# Patient Record
Sex: MALE | Race: BLACK OR AFRICAN AMERICAN
[De-identification: names, ages, dates, MRNs, and addresses within clinical notes are randomized per-mention and may not be internally consistent; named-entity substitution may affect disease eponyms.]

---

## 2020-07-25 ENCOUNTER — HOSPITAL ENCOUNTER (INPATIENT)
Dept: HOSPITAL 75 - ER | Age: 30
LOS: 1 days | Discharge: LEFT BEFORE DISCHARGE | DRG: 310 | End: 2020-07-26
Attending: INTERNAL MEDICINE | Admitting: INTERNAL MEDICINE
Payer: SELF-PAY

## 2020-07-25 VITALS — WEIGHT: 196.21 LBS | BODY MASS INDEX: 28.09 KG/M2 | HEIGHT: 70.08 IN

## 2020-07-25 DIAGNOSIS — I48.0: Primary | ICD-10-CM

## 2020-07-25 DIAGNOSIS — F17.210: ICD-10-CM

## 2020-07-25 LAB
ALBUMIN SERPL-MCNC: 4.5 GM/DL (ref 3.2–4.5)
ALP SERPL-CCNC: 41 U/L (ref 40–136)
ALT SERPL-CCNC: 29 U/L (ref 0–55)
APTT BLD: 26 SEC (ref 24–35)
BASOPHILS # BLD AUTO: 0 10^3/UL (ref 0–0.1)
BASOPHILS NFR BLD AUTO: 0 % (ref 0–10)
BILIRUB SERPL-MCNC: 0.4 MG/DL (ref 0.1–1)
BUN/CREAT SERPL: 11
CALCIUM SERPL-MCNC: 9.9 MG/DL (ref 8.5–10.1)
CHLORIDE SERPL-SCNC: 107 MMOL/L (ref 98–107)
CO2 SERPL-SCNC: 25 MMOL/L (ref 21–32)
CREAT SERPL-MCNC: 1.16 MG/DL (ref 0.6–1.3)
EOSINOPHIL # BLD AUTO: 0 10^3/UL (ref 0–0.3)
EOSINOPHIL NFR BLD AUTO: 1 % (ref 0–10)
ERYTHROCYTE [DISTWIDTH] IN BLOOD BY AUTOMATED COUNT: 12.2 % (ref 10–14.5)
GFR SERPLBLD BASED ON 1.73 SQ M-ARVRAT: > 60 ML/MIN
GLUCOSE SERPL-MCNC: 77 MG/DL (ref 70–105)
HCT VFR BLD CALC: 42 % (ref 40–54)
HGB BLD-MCNC: 14.8 G/DL (ref 13.3–17.7)
INR PPP: 1 (ref 0.8–1.4)
LYMPHOCYTES # BLD AUTO: 2.8 X 10^3 (ref 1–4)
LYMPHOCYTES NFR BLD AUTO: 40 % (ref 12–44)
MAGNESIUM SERPL-MCNC: 2 MG/DL (ref 1.6–2.4)
MANUAL DIFFERENTIAL PERFORMED BLD QL: NO
MCH RBC QN AUTO: 32 PG (ref 25–34)
MCHC RBC AUTO-ENTMCNC: 35 G/DL (ref 32–36)
MCV RBC AUTO: 91 FL (ref 80–99)
MONOCYTES # BLD AUTO: 0.6 X 10^3 (ref 0–1)
MONOCYTES NFR BLD AUTO: 9 % (ref 0–12)
NEUTROPHILS # BLD AUTO: 3.6 X 10^3 (ref 1.8–7.8)
NEUTROPHILS NFR BLD AUTO: 50 % (ref 42–75)
PLATELET # BLD: 274 10^3/UL (ref 130–400)
PMV BLD AUTO: 9.5 FL (ref 7.4–10.4)
POTASSIUM SERPL-SCNC: 4.1 MMOL/L (ref 3.6–5)
PROT SERPL-MCNC: 7.7 GM/DL (ref 6.4–8.2)
PROTHROMBIN TIME: 13.3 SEC (ref 12.2–14.7)
SODIUM SERPL-SCNC: 143 MMOL/L (ref 135–145)
WBC # BLD AUTO: 7.1 10^3/UL (ref 4.3–11)

## 2020-07-25 PROCEDURE — 83874 ASSAY OF MYOGLOBIN: CPT

## 2020-07-25 PROCEDURE — 84100 ASSAY OF PHOSPHORUS: CPT

## 2020-07-25 PROCEDURE — 71045 X-RAY EXAM CHEST 1 VIEW: CPT

## 2020-07-25 PROCEDURE — 80061 LIPID PANEL: CPT

## 2020-07-25 PROCEDURE — 80053 COMPREHEN METABOLIC PANEL: CPT

## 2020-07-25 PROCEDURE — 93005 ELECTROCARDIOGRAM TRACING: CPT

## 2020-07-25 PROCEDURE — 80048 BASIC METABOLIC PNL TOTAL CA: CPT

## 2020-07-25 PROCEDURE — 36415 COLL VENOUS BLD VENIPUNCTURE: CPT

## 2020-07-25 PROCEDURE — 85730 THROMBOPLASTIN TIME PARTIAL: CPT

## 2020-07-25 PROCEDURE — 84443 ASSAY THYROID STIM HORMONE: CPT

## 2020-07-25 PROCEDURE — 85610 PROTHROMBIN TIME: CPT

## 2020-07-25 PROCEDURE — 83735 ASSAY OF MAGNESIUM: CPT

## 2020-07-25 PROCEDURE — 93041 RHYTHM ECG TRACING: CPT

## 2020-07-25 PROCEDURE — 87081 CULTURE SCREEN ONLY: CPT

## 2020-07-25 PROCEDURE — 85025 COMPLETE CBC W/AUTO DIFF WBC: CPT

## 2020-07-25 PROCEDURE — 84484 ASSAY OF TROPONIN QUANT: CPT

## 2020-07-25 RX ADMIN — Medication SCH MLS/HR: at 22:09

## 2020-07-25 NOTE — ED CARDIAC GENERAL
History of Present Illness


General


Chief Complaint:  Cardiac/General Problems


Stated Complaint:  AFIB


Nursing Triage Note:  


Patient reports feeling palpitations. reports having history of a-fib


Source:  patient, old records


Exam Limitations:  no limitations





History of Present Illness


Date Seen by Provider:  2020


Time Seen by Provider:  21:51


Initial Comments


This 29-year-old young man presents to the emergency room with palpitations and 

chest pain that started today around 19:00 after he did some mowing.  He reports

missing his flecainide and metoprolol dose yesterday.  He did take it today 

around 14:00.  He has history of paroxysmal A. fib and has seen Dr. Joel in the

past.  He is in A. fib with RVR on the monitor with a heart rate around 180.  He

takes aspirin 81 mg daily but is not anticoagulated.  He receives his primary 

care at the [facility] clinic and his prescriptions are written by Dr. Hurt.





Allergies and Home Medications


Allergies


Coded Allergies:  


     No Known Drug Allergies (Unverified , 12)





Home Medications


Aspirin 81 Mg Tablet.dr, 81 MG PO DAILY, (Reported)


Flecainide Acetate 50 Mg Tablet, 100 MG PO BID, (Reported)


[Metoprolol Succinate] 50 MG TAB, 50 MG PO DAILY


   Prescribed by: XAVIER MCKNIGHT NWNOLVIA on 6/10/15 0922





Patient Home Medication List


Home Medication List Reviewed:  Yes





Review of Systems


Review of Systems


Constitutional:  no symptoms reported


EENTM:  No Symptoms Reported


Respiratory:  No Symptoms Reported


Cardiovascular:  See HPI


Gastrointestinal:  No Symptoms Reported


Genitourinary:  No Symptoms Reported


Musculoskeletal:  no symptoms reported


Skin:  no symptoms reported


Psychiatric/Neurological:  No Symptoms Reported


Endocrine:  No Symptoms Reported


Hematologic/Lymphatic:  No Symptoms Reported





Past Medical-Social-Family Hx


Past Med/Social Hx:  Reviewed Nursing Past Med/Soc Hx


Patient Social History


Alcohol Use:  Regular Use


Alcohol Beverage of Choice:  Beer


Recreational Drug Use:  No


Smoking Status:  Current Everyday Smoker


Type Used:  Cigarettes


Recent Foreign Travel:  No


Contact w/Someone Who Travel:  No


Recent Infectious Disease Expo:  No





Immunizations Up To Date


PED Vaccines UTD:  No





Seasonal Allergies


Seasonal Allergies:  No





Past Medical History


Surgeries:  Yes


Abdominal, Orthopedic


Respiratory:  No


Cardiac:  Yes


Atrial Fibrillation (paroxysmal)


Reproductive Disorders:  No


Sexually Transmitted Disease:  No


HIV/AIDS:  No


Gastrointestinal:  No


Musculoskeletal:  No


Endocrine:  No


HEENT:  No


Cancer:  No


Psychosocial:  Yes


ADD/ADHD


Integumentary:  No





Family Medical History


Reviewed Nursing Family Hx





CVA


Diabetes mellitus


  19 MOTHER


Hypertension


  19 MOTHER





Physical Exam


Vital Signs





Vital Signs - First Documented








 20





 21:51


 


Temp 36.9


 


Pulse 176


 


Resp 18


 


B/P (MAP) 137/99 (112)


 


Pulse Ox 99


 


O2 Delivery Room Air





Capillary Refill : Less Than 3 Seconds


Height, Weight, BMI


Height: 5'10"


Weight: 116lbs. 12.8oz. 52.339161hm; 28.00 BMI


Method:Stated


General Appearance:  No Apparent Distress, WD/WN


HEENT:  PERRL/EOMI, Normal ENT Inspection


Neck:  Normal Inspection


Respiratory:  Lungs Clear, Normal Breath Sounds, No Accessory Muscle Use, No 

Respiratory Distress


Cardiovascular:  No Edema, No Murmur, Irregularly Irregular, Tachycardia


Gastrointestinal:  Normal Bowel Sounds, Non Tender, Soft


Extremity:  Normal Inspection, No Pedal Edema


Neurologic/Psychiatric:  Alert, Oriented x3, No Motor/Sensory Deficits, Normal 

Mood/Affect, CNs II-XII Norm as Tested


Skin:  Normal Color, Warm/Dry





Progress/Results/Core Measures


Results/Orders


Lab Results





Laboratory Tests








Test


 20


21:50 Range/Units


 


 


White Blood Count


 7.1 


 4.3-11.0


10^3/uL


 


Red Blood Count


 4.62 


 4.35-5.85


10^6/uL


 


Hemoglobin 14.8  13.3-17.7  G/DL


 


Hematocrit 42  40-54  %


 


Mean Corpuscular Volume 91  80-99  FL


 


Mean Corpuscular Hemoglobin 32  25-34  PG


 


Mean Corpuscular Hemoglobin


Concent 35 


 32-36  G/DL





 


Red Cell Distribution Width 12.2  10.0-14.5  %


 


Platelet Count


 274 


 130-400


10^3/uL


 


Mean Platelet Volume 9.5  7.4-10.4  FL


 


Neutrophils (%) (Auto) 50  42-75  %


 


Lymphocytes (%) (Auto) 40  12-44  %


 


Monocytes (%) (Auto) 9  0-12  %


 


Eosinophils (%) (Auto) 1  0-10  %


 


Basophils (%) (Auto) 0  0-10  %


 


Neutrophils # (Auto) 3.6  1.8-7.8  X 10^3


 


Lymphocytes # (Auto) 2.8  1.0-4.0  X 10^3


 


Monocytes # (Auto) 0.6  0.0-1.0  X 10^3


 


Eosinophils # (Auto)


 0.0 


 0.0-0.3


10^3/uL


 


Basophils # (Auto)


 0.0 


 0.0-0.1


10^3/uL


 


Prothrombin Time 13.3  12.2-14.7  SEC


 


INR Comment 1.0  0.8-1.4  


 


Activated Partial


Thromboplast Time 26 


 24-35  SEC





 


Sodium Level 143  135-145  MMOL/L


 


Potassium Level 4.1  3.6-5.0  MMOL/L


 


Chloride Level 107    MMOL/L


 


Carbon Dioxide Level 25  21-32  MMOL/L


 


Anion Gap 11  5-14  MMOL/L


 


Blood Urea Nitrogen 13  7-18  MG/DL


 


Creatinine


 1.16 


 0.60-1.30


MG/DL


 


Estimat Glomerular Filtration


Rate > 60 


  





 


BUN/Creatinine Ratio 11   


 


Glucose Level 77    MG/DL


 


Calcium Level 9.9  8.5-10.1  MG/DL


 


Corrected Calcium 9.5  8.5-10.1  MG/DL


 


Magnesium Level 2.0  1.6-2.4  MG/DL


 


Total Bilirubin 0.4  0.1-1.0  MG/DL


 


Aspartate Amino Transf


(AST/SGOT) 21 


 5-34  U/L





 


Alanine Aminotransferase


(ALT/SGPT) 29 


 0-55  U/L





 


Alkaline Phosphatase 41    U/L


 


Myoglobin


 26.0 


 10.0-92.0


NG/ML


 


Troponin I < 0.028  <0.028  NG/ML


 


Total Protein 7.7  6.4-8.2  GM/DL


 


Albumin 4.5  3.2-4.5  GM/DL


 


Thyroid Stimulating Hormone


(TSH) 1.12 


 0.35-4.94


UIU/ML








My Orders





Orders - FENG VANN MD


Diltiazem Injection (Cardizem Injection) (20 22:00)


Diltiazem Drip Pre-Mix (Cardizem Drip Pr (20 22:00)


Digoxin Injection (Lanoxin Injection) (20 23:45)


Apixaban Tablet (Eliquis Tablet) (20 23:45)





Medications Given in ED





Current Medications








 Medications  Dose


 Ordered  Sig/Kimberly


 Route  Start Time


 Stop Time Status Last Admin


Dose Admin


 


 Aspirin  324 mg  ONCE  ONCE


 PO  20 22:00


 20 22:01 DC 7/25/20 22:09


324 MG


 


 Diltiazem HCl  10 mg  ONCE  ONCE


 IVP  20 22:00


 20 22:01 DC 20 22:09


10 MG








Vital Signs/I&O











 20





 21:51 21:57


 


Temp 36.9 


 


Pulse 176 


 


Resp 18 


 


B/P (MAP) 137/99 (112) 


 


Pulse Ox 99 


 


O2 Delivery Room Air Room Air














Blood Pressure Mean:                    112











Progress


Progress Note #1:  


   Time:  22:18


Progress Note


Patient seen and examined.  Cardizem bolus and drip ordered.  Labs pending.


Progress Note #2:  


   Time:  23:30


Progress Note


Patient's Cardizem drip was titrated up to 20 mg per hour.  He was still running

a heart rate ranging from .  Case was reviewed with Dr. Felton.  He 

recommended giving a dose of his flecainide now and adding digoxin 0.5 mg IV and

Eliquis 5 mg orally.  Patient is being admitted to the ICU.


Initial ECG Impression Date:  2020


Initial ECG Impression Time:  21:53


Initial ECG Rate:  180


Initial ECG Rhythm:  A Fib/Flutter


Initial ECG Impression:  Atrial Fibrillation w/RVR


Comment


Atrial fibrillation with RVR.  Nonspecific T-wave changes.





Diagnostic Imaging





   Diagonstic Imaging:  Xray


   Plain Films/CT/US/NM/MRI:  chest


Comments


NAME:   NOE DEVINE


[facility] REC#:   [id number]


ACCOUNT#:   [id number]


PT STATUS:   REG ER


:   1990


PHYSICIAN:   YVONNE FELIZ APRN


ADMIT DATE:   20/ER


***Draft***


Date of Exam:20





CHEST 1 VIEW, AP/PA ONLY








INDICATION: Chest pain.





EXAMINATION: Single AP view of the chest was obtained.





COMPARISON: Study of 2015.





FINDINGS: Heart size and pulmonary vascularity are within normal


limits. There is no pneumothorax or consolidation. There is


similar appearance of eventrated left hemidiaphragm.





IMPRESSION: No acute abnormality or adverse change is detected. 





  Dictated on workstation # [id number]








Dict:   20


Trans:   20


[facility] [id number]





Interpreted by:     GARTH BOX MD


   Reviewed:  Reviewed by Me





Departure


Communication (Admissions)


Time/Spoke to Admitting Phy:  23:38


Dr. Rivero


Time/Spoke to Consulting Phy:  23:25


Dr. Felton





Impression





   Primary Impression:  


   Atrial fibrillation with RVR


Disposition:   ADMITTED AS INPATIENT


Condition:  Improved





Admissions


Decision to Admit Reason:  Admit from ER (General)


Decision to Admit/Date:  2020


Time/Decision to Admit Time:  23:20





Copy


Copies To 1:   CHERELLE HURT MD, JOSHUA T MD        2020 22:05

## 2020-07-25 NOTE — DIAGNOSTIC IMAGING REPORT
INDICATION: Chest pain.



EXAMINATION: Single AP view of the chest was obtained.



COMPARISON: Study of 06/08/2015.



FINDINGS: Heart size and pulmonary vascularity are within normal

limits. There is no pneumothorax or consolidation. There is

similar appearance of eventrated left hemidiaphragm.



IMPRESSION: No acute abnormality or adverse change is detected. 



Dictated by: 



  Dictated on workstation # [id number]

## 2020-07-26 VITALS — SYSTOLIC BLOOD PRESSURE: 123 MMHG | DIASTOLIC BLOOD PRESSURE: 89 MMHG

## 2020-07-26 VITALS — DIASTOLIC BLOOD PRESSURE: 74 MMHG | SYSTOLIC BLOOD PRESSURE: 109 MMHG

## 2020-07-26 VITALS — SYSTOLIC BLOOD PRESSURE: 113 MMHG | DIASTOLIC BLOOD PRESSURE: 85 MMHG

## 2020-07-26 VITALS — SYSTOLIC BLOOD PRESSURE: 115 MMHG | DIASTOLIC BLOOD PRESSURE: 74 MMHG

## 2020-07-26 VITALS — SYSTOLIC BLOOD PRESSURE: 128 MMHG | DIASTOLIC BLOOD PRESSURE: 80 MMHG

## 2020-07-26 VITALS — SYSTOLIC BLOOD PRESSURE: 103 MMHG | DIASTOLIC BLOOD PRESSURE: 71 MMHG

## 2020-07-26 VITALS — DIASTOLIC BLOOD PRESSURE: 81 MMHG | SYSTOLIC BLOOD PRESSURE: 116 MMHG

## 2020-07-26 VITALS — SYSTOLIC BLOOD PRESSURE: 113 MMHG | DIASTOLIC BLOOD PRESSURE: 77 MMHG

## 2020-07-26 VITALS — DIASTOLIC BLOOD PRESSURE: 93 MMHG | SYSTOLIC BLOOD PRESSURE: 134 MMHG

## 2020-07-26 VITALS — SYSTOLIC BLOOD PRESSURE: 126 MMHG | DIASTOLIC BLOOD PRESSURE: 75 MMHG

## 2020-07-26 VITALS — SYSTOLIC BLOOD PRESSURE: 145 MMHG | DIASTOLIC BLOOD PRESSURE: 97 MMHG

## 2020-07-26 VITALS — DIASTOLIC BLOOD PRESSURE: 64 MMHG | SYSTOLIC BLOOD PRESSURE: 99 MMHG

## 2020-07-26 VITALS — SYSTOLIC BLOOD PRESSURE: 130 MMHG | DIASTOLIC BLOOD PRESSURE: 84 MMHG

## 2020-07-26 VITALS — SYSTOLIC BLOOD PRESSURE: 94 MMHG | DIASTOLIC BLOOD PRESSURE: 65 MMHG

## 2020-07-26 VITALS — SYSTOLIC BLOOD PRESSURE: 138 MMHG | DIASTOLIC BLOOD PRESSURE: 93 MMHG

## 2020-07-26 VITALS — SYSTOLIC BLOOD PRESSURE: 120 MMHG | DIASTOLIC BLOOD PRESSURE: 81 MMHG

## 2020-07-26 LAB
BASOPHILS # BLD AUTO: 0 10^3/UL (ref 0–0.1)
BASOPHILS NFR BLD AUTO: 0 % (ref 0–10)
BUN/CREAT SERPL: 11
CALCIUM SERPL-MCNC: 9.8 MG/DL (ref 8.5–10.1)
CHLORIDE SERPL-SCNC: 105 MMOL/L (ref 98–107)
CHOLEST SERPL-MCNC: 188 MG/DL (ref ?–200)
CO2 SERPL-SCNC: 26 MMOL/L (ref 21–32)
CREAT SERPL-MCNC: 1.07 MG/DL (ref 0.6–1.3)
EOSINOPHIL # BLD AUTO: 0 10^3/UL (ref 0–0.3)
EOSINOPHIL NFR BLD AUTO: 0 % (ref 0–10)
ERYTHROCYTE [DISTWIDTH] IN BLOOD BY AUTOMATED COUNT: 12.3 % (ref 10–14.5)
GFR SERPLBLD BASED ON 1.73 SQ M-ARVRAT: > 60 ML/MIN
GLUCOSE SERPL-MCNC: 93 MG/DL (ref 70–105)
HCT VFR BLD CALC: 41 % (ref 40–54)
HDLC SERPL-MCNC: 56 MG/DL (ref 40–60)
HGB BLD-MCNC: 14.3 G/DL (ref 13.3–17.7)
LYMPHOCYTES # BLD AUTO: 2.8 X 10^3 (ref 1–4)
LYMPHOCYTES NFR BLD AUTO: 42 % (ref 12–44)
MAGNESIUM SERPL-MCNC: 2.1 MG/DL (ref 1.6–2.4)
MANUAL DIFFERENTIAL PERFORMED BLD QL: NO
MCH RBC QN AUTO: 32 PG (ref 25–34)
MCHC RBC AUTO-ENTMCNC: 35 G/DL (ref 32–36)
MCV RBC AUTO: 91 FL (ref 80–99)
MONOCYTES # BLD AUTO: 0.6 X 10^3 (ref 0–1)
MONOCYTES NFR BLD AUTO: 9 % (ref 0–12)
NEUTROPHILS # BLD AUTO: 3.2 X 10^3 (ref 1.8–7.8)
NEUTROPHILS NFR BLD AUTO: 48 % (ref 42–75)
PHOSPHATE SERPL-MCNC: 5.1 MG/DL (ref 2.3–4.7)
PLATELET # BLD: 278 10^3/UL (ref 130–400)
PMV BLD AUTO: 9.8 FL (ref 7.4–10.4)
POTASSIUM SERPL-SCNC: 3.9 MMOL/L (ref 3.6–5)
SODIUM SERPL-SCNC: 142 MMOL/L (ref 135–145)
TRIGL SERPL-MCNC: 146 MG/DL (ref ?–150)
VLDLC SERPL CALC-MCNC: 29 MG/DL (ref 5–40)
WBC # BLD AUTO: 6.7 10^3/UL (ref 4.3–11)

## 2020-07-26 RX ADMIN — Medication SCH MLS/HR: at 06:52

## 2020-07-26 NOTE — XMS REPORT
[facility]

                             Created on: 2017



Sarthak Wilhelm

External Reference #: [id number]

: 1990

Sex: Male



Demographics





                          Address                   [address] [place]  [address]

 

                          Preferred Language        Unknown

 

                          Marital Status            Unknown

 

                          Yazidi Affiliation     Unknown

 

                          Race                      Unknown

 

                          Ethnic Group              Unknown





Author





                          Author                    Sarthak CALLOWAY

 

                          Organization              [facility]

 

                          Address                   [address] [place]  [address]



 

                          Phone                     [phone]







Care Team Providers





                    Care Team Member Name Role                Phone

 

                    GAIL CALLOWAYICE    Unavailable         [phone]







PROBLEMS





          Type      Condition ICD9-CM Code TEL99-DY Code Onset Dates Condition S

tatus SNOMED 

Code

 

          Problem   Atrial fibrillation with RVR           I48.91              A

ctive    [id number]

 

          Problem   Atrial fibrillation, unspecified type           I48.91      

        Active    22037638

 

          Problem   Adjustment disorder with depressed mood 309.0               

          Active    15891072

 

          Problem   Major depressive disorder, single episode, unspecified      

     F32.9               Active    

[id number]

 

             Problem      Paroxysmal atrial fibrillation with rapid ventricular 

response              I48.0         

                          Active                    [id number]







ALLERGIES

No Known Allergies



SOCIAL HISTORY

Never Assessed



PLAN OF CARE





                          Activity                  Details

 

                                         

 

                          Follow Up                 prn Reason:

 

                          Future/Pending Procedure  EKG, TRACING (IN-HOUSE) 



                                        



VITAL SIGNS





                    Height              70 in               2017

 

                    Weight              174 lbs             2017

 

                    Temperature         97.8 degrees Fahrenheit 2017

 

                    Heart Rate          85 bpm              2017

 

                    Respiratory Rate    16                  2017

 

                    BMI                 24.96 kg/m2         2017

 

                    Blood pressure systolic 134 mmHg            2017

 

                    Blood pressure diastolic 80 mmHg             2017







MEDICATIONS





        Medication Instructions Dosage  Frequency Start Date End Date Duration S

raya

 

        Flecainide Acetate 100 MG Orally every 12 hrs 1 tablet 12h              

               Active

 

        Aspir-81 81 MG Orally Once a day 1 tablet 24h                           

  Active

 

        Toprol XL 50 mg Orally Once a day 1 tablet 24h                     30 da

ys Active







RESULTS

No Results



PROCEDURES





                Procedure       Date Ordered    Result          Body Site

 

                ELECTROCARDIOGRAM, TRACING May 18, 2017                     







IMMUNIZATIONS

No Known Immunizations



MEDICAL (GENERAL) HISTORY





                    Type                Description         Date

 

                    Medical History     Atrial Fibrillation  

 

                    Surgical History    Right hand fx        

 

                    Hospitalization History Atrial fibrillation

## 2020-07-26 NOTE — XMS REPORT
[facility]

                             Created on: 2016



Sarthak Wilhelm

External Reference #: [id number]

: 1990

Sex: Male



Demographics





                          Address                   [address] [place]  [address]

 

                          Home Phone                [phone]

 

                          Preferred Language        Unknown

 

                          Marital Status            Unknown

 

                          Islam Affiliation     Unknown

 

                          Race                      Black

 

                          Ethnic Group              Not  or 





Author





                          Author                    Sarthka DUONG

 

                          Organization              [employer]

 

                          Address                   Unknown

 

                          Phone                     Unavailable







Care Team Providers





                    Care Team Member Name Role                Phone

 

                    SHERRY DUONG       CP                  Unavailable



                                                                



Allergies, Adverse Reactions, Alerts

          



                    Substance           Reaction            Event Type

 

                    N.K.D.A.            Info Not Available  Non Drug Allergy



                                                                                
       



Problems

          



             Problem Type Condition    Code         Onset Dates  Condition Statu

s

 

             Problem      Adjustment disorder with depressed mood 309.0         

            Active

 

                Assessment      Paroxysmal atrial fibrillation with rapid ventri

cular response I48.0           

                                        Active

 

             Problem      Paroxysmal atrial fibrillation with rapid ventricular 

response I48.0                     

Active

 

             Assessment   Long-term use of high-risk medication Z79.899         

          Active



                                                                                
                                     



Medications

          



        Medication Code System Code    Instructions Start Date End Date Status  

Dosage

 

        Flecainide Acetate NDC     [address]-08 100 MG Orally every 12 hrs     

                    1 tablet

 

        Toprol XL NDC     [address]-23 50 MG Orally Once a day                 

        0.5  tablet

 

        Aspir-81 NDC     [address]-75 81 MG Orally Once a day                  

       1 tablet



                                                                                
                           



Procedures

          



                Procedure       Coding System   Code            Date

 

                COMPREHEN METABOLIC PANEL CPT-4           83465           

 

                ASSAY OF MAGNESIUM CPT-4           04331           2016

 

                COMPLETE CBC W/AUTO DIFF WBC CPT-4           85841           

 

                Office Visit, Est Pt., Level 5 CPT-4           11861           J

azul 2016

 

                LIPID PANEL     CPT-4           33235           2016

 

                ASSAY THYROID STIM HORMONE CPT-4           96039           2016

 

                VENIPUNCT, ROUTINE* CPT-4           88125           , 201

6

 

                ELECTROCARDIOGRAM, TRACING CPT-4           30631           2016



                                                                                
                                                                                
      



Vital Signs

          



                          Date/Time:                2016

 

                          Cardiac Monitoring Heart Rate 96 bpm

 

                          Weight                    158.8 lbs

 

                          Height                    70 in

 

                          Blood Pressure Diastolic  76 mmHg

 

                          Blood Pressure Systolic   120 mmHg



                                                                              



Results

          No Known Results                                                      
             



Summary Purpose

          [employer] Submission

## 2020-07-26 NOTE — XMS REPORT
[facility]

                             Created on: 2017



Sarthak Wilhelm

External Reference #: [id number]

: 1990

Sex: Male



Demographics





                          Address                   [address] [place]  [address]

 

                          Preferred Language        Unknown

 

                          Marital Status            Unknown

 

                          Yazidism Affiliation     Unknown

 

                          Race                      Unknown

 

                          Ethnic Group              Unknown





Author





                          Author                    Sarthak MURO

 

                          Organization              [facility]

 

                          Address                   [address] [place]  [address]



 

                          Phone                     [phone]







Care Team Providers





                    Care Team Member Name Role                Phone

 

                    KAYLA MURO      Unavailable         [phone]







PROBLEMS





          Type      Condition ICD9-CM Code AXL54-JW Code Onset Dates Condition S

tatus SNOMED 

Code

 

          Problem   Atrial fibrillation with RVR           I48.91              A

ctive    [id number]

 

          Problem   Atrial fibrillation, unspecified type           I48.91      

        Active    13664757

 

          Problem   Adjustment disorder with depressed mood 309.0               

          Active    16707749

 

          Problem   Major depressive disorder, single episode, unspecified      

     F32.9               Active    

[id number]

 

             Problem      Paroxysmal atrial fibrillation with rapid ventricular 

response              I48.0         

                          Active                    [id number]







ALLERGIES





             Substance    Reaction     Event Type   Date         Status

 

             N.K.D.A.     Unknown      Non Drug Allergy  Unknown







SOCIAL HISTORY

No smoking Hx information available



PLAN OF CARE





VITAL SIGNS





                    Height              70 in               2017

 

                    Temperature         97.9 degrees Fahrenheit 2017

 

                    Heart Rate          80 bpm              2017

 

                    Respiratory Rate    16                  2017

 

                    Blood pressure systolic 130 mmHg            2017

 

                    Blood pressure diastolic 82 mmHg             2017







MEDICATIONS





        Medication Instructions Dosage  Frequency Start Date End Date Duration S

tatus

 

        Aspir-81 81 MG Orally Once a day 1 tablet 24h                           

  Active

 

        Flecainide Acetate 100 MG Orally every 12 hrs 1 tablet 12h              

               Active

 

        Toprol XL 50 mg Orally Once a day 1 tablet 24h                          

   Active







RESULTS

No Results



PROCEDURES





                Procedure       Date Ordered    Related Diagnosis Body Site

 

                Office Visit, Est Pt., Level 3 2017                     







IMMUNIZATIONS

No Known Immunizations

## 2020-07-26 NOTE — XMS REPORT
[facility]

                             Created on: 2017



Sarthak Wilhelm

External Reference #: [id number]

: 1990

Sex: Male



Demographics





                          Address                   [address] [place]  [address]

 

                          Preferred Language        Unknown

 

                          Marital Status            Unknown

 

                          Sikhism Affiliation     Unknown

 

                          Race                      Unknown

 

                          Ethnic Group              Unknown





Author





                          Author                    Sarthak DUONG

 

                          [facility]

 

                          Address                   [address] [place]  [address]



 

                          Phone                     [phone]







Care Team Providers





                    Care Team Member Name Role                Phone

 

                    SHERRY DUNOG       Unavailable         [phone]







PROBLEMS





          Type      Condition ICD9-CM Code YBX49-DP Code Onset Dates Condition S

tatus SNOMED 

Code

 

          Problem   Atrial fibrillation, unspecified type           I48.91      

        Active    75141524

 

          Problem   Major depressive disorder, single episode, unspecified      

     F32.9               Active    

51545524

 

             Problem      Paroxysmal atrial fibrillation with rapid ventricular 

response              I48.0         

                          Active                    128985746

 

          Problem   Adjustment disorder with depressed mood 309.0               

          Active    95011453







ALLERGIES

Unknown Allergies



SOCIAL HISTORY

No smoking Hx information available



PLAN OF CARE





VITAL SIGNS





MEDICATIONS





        Medication Instructions Dosage  Frequency Start Date End Date Duration S

tatus

 

                    Flecainide Acetate 100 MG Orally every 12 hrs-MUST KEEP APPO

INTMENT FOR REFILL 1

tablet                                              15 days      Active







RESULTS

No Results



PROCEDURES

No Known procedures



IMMUNIZATIONS

No Known Immunizations

## 2020-07-26 NOTE — XMS REPORT
[facility]

                             Created on: 2018



Sarthak Wilhelm

External Reference #: [id number]

: 1990

Sex: Male



Demographics





                          Address                   [address] [place]  [address]

 

                          Preferred Language        Unknown

 

                          Marital Status            Unknown

 

                          Quaker Affiliation     Unknown

 

                          Race                      Unknown

 

                          Ethnic Group              Unknown





Author





                          Author                    Sarthak SHOEMAKER

 

                          Organization              [facility]

 

                          Address                   [address] [place]  [address]



 

                          Phone                     [phone]







Care Team Providers





                    Care Team Member Name Role                Phone

 

                    PADMINI SHOEMAKER      Unavailable         [phone]







PROBLEMS





          Type      Condition ICD9-CM Code YEQ24-RS Code Onset Dates Condition S

tatus SNOMED 

Code

 

          Problem   Psychophysiological insomnia           F51.04              A

ctive    [id number]

 

          Problem   Atrial fibrillation with RVR           I48.91              A

ctive    [id number]

 

          Problem   Major depressive disorder, single episode, unspecified      

     F32.9               Active    

[id number]

 

             Problem      Paroxysmal atrial fibrillation with rapid ventricular 

response              I48.0         

                          Active                    [id number]







ALLERGIES

No Known Allergies



ENCOUNTERS





                Encounter       Location        Date            Diagnosis

 

                          [place] [id number]

[address] [place] [address]

                          29 Aug, 2018              Atrial fibrillation with RVR

 I48.91

 

                          [facility]     [address] [facility] [id number]

[address] [place] [address]

                                         

 

                          [place] [address] [place] [id number]

[address] [place] [address]

                                        Establishing care with new d

octor, encounter for Z76.89 ; Atrial 

fibrillation with RVR I48.91 ; Psychophysiological insomnia F51.04 and Viral 
syndrome B34.9

 

                          [facility]     [address] [facility] [id number]

[address] [place] [phone]

                          19 Dec, 2017              Paroxysmal atrial fibrillati

on with rapid ventricular response 

I48.0 and Viral syndrome B34.9

 

                          [place] [address] [facility] [id number]

[address] [place] [phone]

                                         

 

                          [facility]  [address] [facility] [id number]

[address] [place] [address] 

[phone]                18 May, 2017              Muscle strain of chest wall,

 initial encounter S29.011A 

and Chest pain, unspecified type R07.9

 

                          [place] [address] [facility] [id number]

[address] [place] [address]

                          02 May, 2017              Paroxysmal atrial fibrillati

on with rapid ventricular response 

I48.0

 

                          [place] [address] [facility] [id number]

[address] [place] [address]

                                        Paroxysmal atrial fibrillati

on with rapid ventricular response 

I48.0

 

                          [facility] [place] [address] [id number]

[address] [place] [address] 

[phone]                              Atrial fibrillation with RVR

 I48.91

 

                          [place] [address] [facility] [id number]

[address] [place] [address]

                          27 Dec, 2016              Paroxysmal atrial fibrillati

on with rapid ventricular response 

I48.0 and Long-term use of high-risk medication Z79.899

 

                          [place] [address] [place] [id number]

[address] [place] [address]

                          12 Dec, 2016               

 

                          [facility] [place] [address] [facility] [id number]

[address] [place] [address] 

[phone]                14 2016              Atrial fibrillation, unspeci

fied type I48.91

 

                          [facility] [place] [address] [facility] [id number]

[address] [place] [address] 

[phone]                05 2016              Atrial fibrillation, unspeci

fied type I48.91

 

                          [facility] [place] [address] [place] [id number]

[address] [place] [address] 

[phone]                17 Oct, 2016              Major depressive disorder, s

ger episode, unspecified 

F32.9 ; Other fatigue R53.83 and Chest pain on breathing R07.1

 

                          [place] [address] [facility] [id number]

[address] [place] [phone]

                          23 Sep, 2016              PAF (paroxysmal atrial fibri

llation) I48.0 ; Dyspnea, unspecified 

type R06.00 ; Chest pain, unspecified type R07.9 and Light headedness R42

 

                          [place] [address] [facility] [id number]

[address] [place] [address]

                                        Paroxysmal atrial fibrillati

on with rapid ventricular response 

I48.0 and Long-term use of high-risk medication Z79.899

 

                          [facility]     [address] [facility] [id number]

[address] [place] [phone]

                                        Adjustment disorder with dep

ressed mood 309.0 and No condition on 

Axis II V71.09

 

                          [facility]     [address] [facility] [id number]

[address] [place] [address]

                                        Atrial fibrillation 427.31

 

                          [place] [address] [facility] [id number]

[address] [place] [phone]

                          15 [address], 2015               







IMMUNIZATIONS

No Known Immunizations



SOCIAL HISTORY

Never Assessed



REASON FOR VISIT

A Fib , needing refill on medications -- clement looney



PLAN OF CARE





                          Activity                  Details

 

                                         

 

                          Follow Up                 w/ Dr. Joel Reason:a-fib







VITAL SIGNS





                    Height              70 in               2018

 

                    Weight              194.0 lbs           2018

 

                    Temperature         98.7 degrees Fahrenheit 2018

 

                    Heart Rate          86 bpm              2018

 

                    Respiratory Rate    20                  2018

 

                    BMI                 27.83 kg/m2         2018

 

                    Blood pressure systolic 142 mmHg            2018

 

                    Blood pressure diastolic 90 mmHg             2018







MEDICATIONS





        Medication Instructions Dosage  Frequency Start Date End Date Duration S

tatus

 

        Toprol XL 50 mg Orally Once a day 1 tablet 24h                     30 da

ys Active

 

        Aspir-81 81 MG Orally Once a day 1 tablet 24h                     30 day

s Active

 

        Flecainide Acetate 100 mg Orally every 12 hrs 1 tablet 12h              

       30 days Active







RESULTS

No Results



PROCEDURES

No Known procedures



INSTRUCTIONS





MEDICATIONS ADMINISTERED

No Known Medications



MEDICAL (GENERAL) HISTORY





                    Type                Description         Date

 

                    Medical History     Atrial Fibrillation  

 

                    Surgical History    Right hand fx        

 

                    Hospitalization History Atrial fibrillation

## 2020-07-26 NOTE — NUR
Pt verbalized desire to leave. Told pt Dr. Felton would be here shortly to see him. Pt does 
not want to wait. Discussed risks of leaving AMA, including deteriorating condition and/or 
death. Pt verbalizes understanding. Dr. Rivero notified.

## 2020-07-26 NOTE — XMS REPORT
[facility]

                             Created on: 2018



Sarthak Wilhelm

External Reference #: [id number]

: 1990

Sex: Male



Demographics





                          Address                   [address] [place]  [address]

 

                          Preferred Language        Unknown

 

                          Marital Status            Unknown

 

                          Anabaptism Affiliation     Unknown

 

                          Race                      Unknown

 

                          Ethnic Group              Unknown





Author





                          Author                    Sarthak SHOEMAKER

 

                          Organization              [facility]

 

                          Address                   [address] [place]  [address]



 

                          Phone                     [phone]







Care Team Providers





                    Care Team Member Name Role                Phone

 

                    PADMINI SHOEMAKER      Unavailable         [phone]







PROBLEMS





          Type      Condition ICD9-CM Code DSA39-PV Code Onset Dates Condition S

tatus SNOMED 

Code

 

          Problem   Psychophysiological insomnia           F51.04              A

ctive    [id number]

 

          Problem   Atrial fibrillation with RVR           I48.91              A

ctive    [id number]

 

          Problem   Major depressive disorder, single episode, unspecified      

     F32.9               Active    

[id number]

 

             Problem      Paroxysmal atrial fibrillation with rapid ventricular 

response              I48.0         

                          Active                    [id number]







ALLERGIES

No Information



ENCOUNTERS





                Encounter       Location        Date            Diagnosis

 

                          [facility]     [address] [place] [id number]

[address] [place] [phone]

                          29 Aug, 2018              Atrial fibrillation with RVR

 I48.91

 

                          [facility]     [address] [facility] [id number]

[address] [place] [phone]

                                         

 

                          [place] [address] [place] [id number]

[address] [place] [address]

                                        Establishing care with new d

octor, encounter for Z76.89 ; Atrial 

fibrillation with RVR I48.91 ; Psychophysiological insomnia F51.04 and Viral 
syndrome B34.9

 

                          [facility]     [address] [facility] [id number]

[address] [place] [phone]

                          19 Dec, 2017              Paroxysmal atrial fibrillati

on with rapid ventricular response 

I48.0 and Viral syndrome B34.9

 

                          [place] [id number] [place] [id number]

[address] [place] [phone]

                                         

 

                          [facility]  [address] [facility] [id number]

[address] [place] [address] 

[phone]                18 May, 2017              Muscle strain of chest wall,

 initial encounter S29.011A 

and Chest pain, unspecified type R07.9

 

                          [place] [address] [facility] [id number]

[address] [place] [phone]

                          02 May, 2017              Paroxysmal atrial fibrillati

on with rapid ventricular response 

I48.0

 

                          [place] [address] [facility] [id number]

[address] [place] [phone]

                                        Paroxysmal atrial fibrillati

on with rapid ventricular response 

I48.0

 

                          [facility] WALK IN [place] [address] [id number]

[address] [place] [address] 

[address]                              Atrial fibrillation with RVR

 I48.91

 

                          [place] [address] [id number]

[address] [place] [phone]

                          27 Dec, 2016              Paroxysmal atrial fibrillati

on with rapid ventricular response 

I48.0 and Long-term use of high-risk medication Z79.899

 

                          [place] [address] [place] [id number]

[address] [place] [phone]

                          12 Dec, 2016               

 

                          [facility] [place] [address] [id number]

[address] [place] [address] 

[phone]                14 2016              Atrial fibrillation, unspeci

fied type I48.91

 

                          [facility] [place] [address] [facility] [id number]

[address] [place] [address] 

[phone]                05 2016              Atrial fibrillation, unspeci

fied type I48.91

 

                          [facility] [place] [address] [place] [id number]

[address] [place] [address] 

[phone]                17 Oct, 2016              Major depressive disorder, s

ger episode, unspecified 

F32.9 ; Other fatigue R53.83 and Chest pain on breathing R07.1

 

                          [place] [address] [facility] [id number]

[address] [place] [phone]

                          23 Sep, 2016              PAF (paroxysmal atrial fibri

llation) I48.0 ; Dyspnea, unspecified 

type R06.00 ; Chest pain, unspecified type R07.9 and Light headedness R42

 

                          [place] [address] [facility] [id number]

[address] [place] [phone]

                                        Paroxysmal atrial fibrillati

on with rapid ventricular response 

I48.0 and Long-term use of high-risk medication Z79.899

 

                          [place] [address] [facility] [id number]

[address] [place] [phone]

                                        Adjustment disorder with dep

ressed mood 309.0 and No condition on 

Axis II V71.09

 

                          [place] [address] [facility] [id number]

[address] [place] [phone]

                                        Atrial fibrillation 427.31

 

                          [place] [address] [facility] [id number]

[address] [place] [phone]

                          15 [address], 2015               







IMMUNIZATIONS

No Known Immunizations



SOCIAL HISTORY

Never Assessed



REASON FOR VISIT

Medication refill request



PLAN OF CARE





VITAL SIGNS





MEDICATIONS

No Known Medications



RESULTS

No Results



PROCEDURES

No Known procedures



INSTRUCTIONS





MEDICATIONS ADMINISTERED

No Known Medications



MEDICAL (GENERAL) HISTORY





                    Type                Description         Date

 

                    Medical History     Atrial Fibrillation  

 

                    Surgical History    Right hand fx        

 

                    Hospitalization History Atrial fibrillation
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
## 2020-07-26 NOTE — XMS REPORT
[facility]

                             Created on: 2018



Sarthak Wilhelm

External Reference #: [id number]

: 1990

Sex: Male



Demographics





                          Address                   [address] [place]  [address]

 

                          Preferred Language        Unknown

 

                          Marital Status            Unknown

 

                          Church Affiliation     Unknown

 

                          Race                      Unknown

 

                          Ethnic Group              Unknown





Author





                          Author                    Sarthak DUONG

 

                          Organization              [facility]

 

                          Address                   [address] [place]  [address]



 

                          Phone                     [phone]







Care Team Providers





                    Care Team Member Name Role                Phone

 

                    CLAUDIALAURA  SHERRY       Unavailable         [phone]







PROBLEMS





          Type      Condition ICD9-CM Code QNB67-EZ Code Onset Dates Condition S

tatus SNOMED 

Code

 

          Problem   Psychophysiological insomnia           F51.04              A

ctive    [id number]

 

          Problem   Atrial fibrillation with RVR           I48.91              A

ctive    [id number]

 

          Problem   Major depressive disorder, single episode, unspecified      

     F32.9               Active    

[id number]

 

             Problem      Paroxysmal atrial fibrillation with rapid ventricular 

response              I48.0         

                          Active                    [id number]







ALLERGIES

No Information



ENCOUNTERS





                Encounter       Location        Date            Diagnosis

 

                          [facility]     [address] [facility] [id number]

[address] [place] [phone]

                                        Establishing care with new d

octor, encounter for Z76.89 ; Atrial 

fibrillation with RVR I48.91 ; Psychophysiological insomnia F51.04 and Viral 
syndrome B34.9

 

                          [facility]     [address] [id number]

[address] [place] [phone]

                          19 Dec, 2017              Paroxysmal atrial fibrillati

on with rapid ventricular response 

I48.0 and Viral syndrome B34.9

 

                          [facility]     [address] [facility] [id number]

[address] [place] [phone]

                                         

 

                          [facility]  [address] [facility] [id number]

[address] [place] [address] 

[phone]                18 May, 2017              Muscle strain of chest wall,

 initial encounter S29.011A 

and Chest pain, unspecified type R07.9

 

                          [facility]     [address] [facility] [id number]

[address] [place] [phone]

                          02 May, 2017              Paroxysmal atrial fibrillati

on with rapid ventricular response 

I48.0

 

                          [facility]     [address] [facility] [id number]

[address] [place] [phone]

                                        Paroxysmal atrial fibrillati

on with rapid ventricular response 

I48.0

 

                          [facility]  [address] [facility] [id number]

[address] [place] [address] 

[phone]                              Atrial fibrillation with RVR

 I48.91

 

                          [place] [address] [facility] [id number]

[address] [place] [phone]

                          27 Dec, 2016              Paroxysmal atrial fibrillati

on with rapid ventricular response 

I48.0 and Long-term use of high-risk medication Z79.899

 

                          [place] [address] [facility] [id number]

[address] [place] [phone]

                          12 Dec, 2016               

 

                          [facility] [place] [address] [facility] [id number] [place] [address] 

[phone]                14 2016              Atrial fibrillation, unspeci

fied type I48.91

 

                          [facility] [place] [address] [facility] [id number]

[address] [place] [address] 

[phone]                05 2016              Atrial fibrillation, unspeci

fied type I48.91

 

                          [facility] [place] [address] [facility] [id number]

[address] [place] [address] 

[phone]                17 Oct, 2016              Major depressive disorder, s

ger episode, unspecified 

F32.9 ; Other fatigue R53.83 and Chest pain on breathing R07.1

 

                          [place] [address] [facility] [id number]

[address] [place] [phone]

                          23 Sep, 2016              PAF (paroxysmal atrial fibri

llation) I48.0 ; Dyspnea, unspecified 

type R06.00 ; Chest pain, unspecified type R07.9 and Light headedness R42

 

                          [place] [address] [facility] [id number]

[address] [place] [phone]

                                        Paroxysmal atrial fibrillati

on with rapid ventricular response 

I48.0 and Long-term use of high-risk medication Z79.899

 

                          [place] [address] [facility] [id number]

[address] [place] [phone]

                                        Adjustment disorder with dep

ressed mood 309.0 and No condition on 

Axis II V71.09

 

                          [facility]     [address] [facility] [id number]

[address] [place] [phone]

                                        Atrial fibrillation 427.31

 

                          [facility]     [address] [facility] [id number]

[address] [place] [phone]

                          15 Carl, 2015               







IMMUNIZATIONS

No Known Immunizations



SOCIAL HISTORY

Never Assessed



REASON FOR VISIT

 refills



PLAN OF CARE





VITAL SIGNS





MEDICATIONS





        Medication Instructions Dosage  Frequency Start Date End Date Duration S

tatus

 

        Toprol XL 50 mg         TAKE ONE TABLET BY MOUTH ONCE DAILY             

            30 days Active

 

          Flecainide Acetate 100 mg           TAKE ONE TABLET BY MOUTH EVERY 12 

HOURS                               30 

days                                    Active







RESULTS

No Results



PROCEDURES

No Known procedures



INSTRUCTIONS





MEDICATIONS ADMINISTERED

No Known Medications



MEDICAL (GENERAL) HISTORY





                    Type                Description         Date

 

                    Medical History     Atrial Fibrillation  

 

                    Surgical History    Right hand fx        

 

                    Hospitalization History Atrial fibrillation

## 2020-07-26 NOTE — HISTORY & PHYSICAL-HOSPITALIST
History of Present Illness


HPI/Chief Complaint


This is a 29-year-old black male with a history of paroxysmal atrial  

fibrillation since the time he was in his early 20s.  The patient had been doing

fairly well on his flecainide and beta blocker.  He presented to the emergency 

room last night with complaints of sustained atrial fibrillation for over 2 

hours with some chest tightness.  The night before he had had too much to drink 

with beer and whiskey and then had mowed the grass with profuse sweating the 

morning of  this presentation.  He was found to be in A. fib with RVR .  

Overnight he's been on a diltiazem drip and his flecainide is been continued and

he has converted to sinus rhythm.  This morning he is feeling much better and 

has no complaints.  Dr. Seo is put him on Eliquis as well.


Source:  patient


Exam Limitations:  no limitations


Date Seen


20


Time Seen by a Provider:  08:50


Attending Physician


Becky Rivero MD


PCP


Bijan Blackburn MD


Referring Physician





Date of Admission


2020 at 23:41





Home Medications & Allergies


Home Medications


Reviewed patient Home Medication Reconciliation performed by pharmacy medication

reconciliations technician and/or nursing.


Patients Allergies have been reviewed.





Allergies





Allergies


Coded Allergies


  No Known Drug Allergies (Unverified12)








Past Medical-Social-Family Hx


Past Med/Social Hx:  Reviewed Nursing Past Med/Soc Hx


Patient Social History


Marrital Status:  cohabiting


Employed/Student:  unemployed


Alcohol Use:  Regular Use


Alcohol Beverage of Choice:  Beer


Recreational Drug Use:  No


Smoking Status:  Current Everyday Smoker


Type Used:  Cigarettes


Recent Foreign Travel:  No


Contact w/other who traveled:  No


Recent Infectious Disease Expo:  No





Immunizations Up To Date


Pediatric:  No





Seasonal Allergies


Seasonal Allergies:  No





Past Medical History


Surgeries:  Abdominal, Orthopedic


Cardiac:  Atrial Fibrillation


Reproductive:  No


Sexually Transmitted Disease:  No


HIV/AIDS:  No


Psychosocial:  ADD/ADHD





Family History


Reviewed Nursing Family Hx





CVA


Diabetes mellitus


  19 MOTHER


Hypertension


  19 MOTHER





Review of Systems


Constitutional:  see HPI


EENTM:  no symptoms reported


Respiratory:  no symptoms reported


Cardiovascular:  palpitations


Gastrointestinal:  no symptoms reported


Genitourinary:  no symptoms reported


Musculoskeletal:  no symptoms reported


Skin:  no symptoms reported


Psychiatric/Neurological:  No Symptoms Reported





Physical Exam


Physical Exam


Vital Signs





Vital Signs - First Documented








 20





 21:51


 


Temp 36.9


 


Pulse 176


 


Resp 18


 


B/P (MAP) 137/99 (112)


 


Pulse Ox 99


 


O2 Delivery Room Air





Capillary Refill : Less Than 3 Seconds


Height, Weight, BMI


Height: 5'10"


Weight: 116lbs. 12.8oz. 52.208951nb; 28.00 BMI


Method:Stated


General Appearance:  No Apparent Distress, WD/WN


HEENT:  TMs Normal, Normal ENT Inspection, Pharynx Normal


Neck:  Full Range of Motion, Normal Inspection, Non Tender, Supple


Respiratory:  Chest Non Tender, Lungs Clear, Normal Breath Sounds, No Accessory 

Muscle Use, No Respiratory Distress


Cardiovascular:  Regular Rate, Rhythm, No Edema, No Gallop, No JVD, No Murmur, 

Normal Peripheral Pulses


Gastrointestinal:  Normal Bowel Sounds, No Organomegaly, No Pulsatile Mass, Non 

Tender, Soft


Rectal:  Deferred


Back:  Normal Inspection


Extremity:  Normal Capillary Refill, Normal Inspection, No Calf Tenderness


Neurologic/Psychiatric:  Alert, Oriented x3, No Motor/Sensory Deficits, Normal 

Mood/Affect, CNs II-XII Norm as Tested


Skin:  Normal Color


Lymphatic:  No Adenopathy





Results


Results/Procedures


Labs


Laboratory Tests


20 21:50








20 02:59








Patient resulted labs reviewed.


Imaging:  Reviewed Imaging Report





Assessment/Plan


Admission Diagnosis


Paroxysmal atrial fibrillation resolved





Plan discharge planning per Dr. Seo


Admission Status:  Observation





Clinical Quality Measures


DVT/VTE Risk/Contraindication:


Risk Factor Score Per Nursin


RFS Level Per Nursing on Admit:  1=Low/No VTE PPX





Copy


Copies To 1:   BIJAN BLACKBURN MD, KATHLEEN M MD         2020 09:16

## 2020-07-26 NOTE — XMS REPORT
[facility]

                             Created on: 10/18/2016



Sarthak Wilhelm

External Reference #: [id number]

: 1990

Sex: Male



Demographics





                          Address                   [address] [place]  [address]

 

                          Home Phone                [phone]

 

                          Preferred Language        Unknown

 

                          Marital Status            Unknown

 

                          Confucianist Affiliation     Unknown

 

                          Race                      Black

 

                          Ethnic Group              Not  or 





Author





                          Author                    Sarthak DAILEY

 

                          Organization              [employer]

 

                          Address                   Unknown

 

                          Phone                     Unavailable







Care Team Providers





                    Care Team Member Name Role                Phone

 

                    FLORY DAILEY     CP                  Unavailable



                                                                



Allergies, Adverse Reactions, Alerts

          



                    Substance           Reaction            Event Type

 

                    N.K.D.A.            Info Not Available  Non Drug Allergy



                                                                                
       



Problems

          



             Problem Type Condition    Code         Onset Dates  Condition Statu

s

 

             Problem      Paroxysmal atrial fibrillation with rapid ventricular 

response I48.0                     

Active

 

             Problem      Adjustment disorder with depressed mood 309.0         

            Active

 

             Problem      Major depressive disorder, single episode, unspecified

 F32.9                     Active

 

             Assessment   Chest pain on breathing R07.1                     Acti

ve

 

             Assessment   Major depressive disorder, single episode, unspecified

 F32.9                     Active

 

             Assessment   Other fatigue R53.83                    Active



                                                                                
                                                         



Medications

          



        Medication Code System Code    Instructions Start Date End Date Status  

Dosage

 

        Flecainide Acetate NDC     [address]-05 100 MG Orally every 12 hrs     

                    1 tablet

 

        Aspir-81 NDC     [address]-11 81 MG Orally Once a day                  

       1 tablet



                                                                                
                 



Procedures

          



                Procedure       Coding System   Code            Date

 

                Office Visit, Est Pt., Level 3 CPT-4           69529           O

ct 2016

 

                ELECTROCARDIOGRAM, TRACING CPT-4           10143           Oct 1

7, 2016



                                                                                
                           



Vital Signs

          



                          Date/Time:                Oct 17, 2016

 

                          Cardiac Monitoring Heart Rate 78 bpm

 

                          Weight                    166.0 lbs

 

                          Height                    70 in

 

                          BMI                       23.82 Index

 

                          Blood Pressure Diastolic  86 mmHg

 

                          Blood Pressure Systolic   142 mmHg



                                                                    



Results

          



           Name       Result     Date       Reference Range Unit       Abnormali

ty Flag

 

           EKG, TRACING (IN-HOUSE)                                              



                                                                    



Summary Purpose

          [employer] Submission

## 2020-07-26 NOTE — XMS REPORT
[facility]

                             Created on: 06/10/2018



Sarthak Wilhelm

External Reference #: [id number]

: 1990

Sex: Male



Demographics





                          Address                   [address] [place]  [address]

 

                          Preferred Language        Unknown

 

                          Marital Status            Unknown

 

                          Alevism Affiliation     Unknown

 

                          Race                      Unknown

 

                          Ethnic Group              Unknown





Author





                          Author                    Sarthak DUONG

 

                          Organization              [facility] [place]

 

                          Address                   [address] [place]  [address]



 

                          Phone                     [phone]







Care Team Providers





                    Care Team Member Name Role                Phone

 

                    AD  SHERRY       Unavailable         [phone]







PROBLEMS





          Type      Condition ICD9-CM Code PUR70-QK Code Onset Dates Condition S

tatus SNOMED 

Code

 

          Problem   Psychophysiological insomnia           F51.04              A

ctive    [id number]

 

          Problem   Atrial fibrillation with RVR           I48.91              A

ctive    [id number]

 

          Problem   Major depressive disorder, single episode, unspecified      

     F32.9               Active    

[id number]

 

             Problem      Paroxysmal atrial fibrillation with rapid ventricular 

response              I48.0         

                          Active                    [id number]







ALLERGIES

No Known Allergies



ENCOUNTERS





                Encounter       Location        Date            Diagnosis

 

                          [place] [id number] [facility] [id number]

[address] [place] [address]

                                        Establishing care with new d

octor, encounter for Z76.89 ; Atrial 

fibrillation with RVR I48.91 ; Psychophysiological insomnia F51.04 and Viral 
syndrome B34.9

 

                          [facility] [place]     [address] [facility] [id number]

[address] [place] [address]

                          19 Dec, 2017              Paroxysmal atrial fibrillati

on with rapid ventricular response 

I48.0 and Viral syndrome B34.9

 

                          [facility] [place]     [address] [facility] [id number]

[address] [place] [address]

                                         

 

                          [facility]  [address] [facility] [id number]

[address] [place] [address] 

[address]                18 May, 2017              Muscle strain of chest wall,

 initial encounter S29.011A 

and Chest pain, unspecified type R07.9

 

                          [facility] [place]     [address] [facility] [id number]

[address] [place] [address]

                          02 May, 2017              Paroxysmal atrial fibrillati

on with rapid ventricular response 

I48.0

 

                          [facility] [place]     [address] [facility] [id number]

[address] [place] [address]

                                        Paroxysmal atrial fibrillati

on with rapid ventricular response 

I48.0

 

                          [facility]  [address] [facility] [id number]

[address] [place] [address] 

[phone]                              Atrial fibrillation with RVR

 I48.91

 

                          [place] [address] [facility] [id number]

[address] [place] [address]

                          27 Dec, 2016              Paroxysmal atrial fibrillati

on with rapid ventricular response 

I48.0 and Long-term use of high-risk medication Z79.899

 

                          [place] [address] [facility] [id number]

[address] [place] [address]

                          12 Dec, 2016               

 

                          [facility] [place] [address] [facility] [id number] [place] [address] 

[id number]                14 2016              Atrial fibrillation, unspeci

fied type I48.91

 

                          [facility] [place] [address] [place] [id number] [place] [address] 

[phone]                05 2016              Atrial fibrillation, unspeci

fied type I48.91

 

                          [facility] [place] [address] [place] [id number]

[address] [place] [address] 

[phone]                17 Oct, 2016              Major depressive disorder, s

ger episode, unspecified 

F32.9 ; Other fatigue R53.83 and Chest pain on breathing R07.1

 

                          [place] [address] [place] [id number]

[address] [place] [address]

                          23 Sep, 2016              PAF (paroxysmal atrial fibri

llation) I48.0 ; Dyspnea, unspecified 

type R06.00 ; Chest pain, unspecified type R07.9 and Light headedness R42

 

                          [place] [address] [facility] [id number]

[address] [place] [address]

                                        Paroxysmal atrial fibrillati

on with rapid ventricular response 

I48.0 and Long-term use of high-risk medication Z79.899

 

                          [place] [address] [facility] [id number]

[address] [place] [address]

                                        Adjustment disorder with dep

ressed mood 309.0 and No condition on 

Axis II V71.09

 

                          [facility] [place]     [address] [facility] [id number]

[address] [address]

                                        Atrial fibrillation 427.31

 

                          [facility] [place]     [address] [facility] [id number]

[address] [address]

                          15 Carl, 2015               







IMMUNIZATIONS

No Known Immunizations



SOCIAL HISTORY

Never Assessed



REASON FOR VISIT

Cardiac F/U-AHarrymanRN



PLAN OF CARE





                          Activity                  Details

 

                                         

 

                          Follow Up                 3 Months Reason:afib







VITAL SIGNS





                    Height              70 in               2017

 

                    Weight              195.0 lbs           2017

 

                    Temperature         97.5 degrees Fahrenheit 2017

 

                    Heart Rate          78 bpm              2017

 

                    Respiratory Rate    18                  2017

 

                    BMI                 27.98 kg/m2         2017

 

                    Blood pressure systolic 126 mmHg            2017

 

                    Blood pressure diastolic 74 mmHg             2017







MEDICATIONS





        Medication Instructions Dosage  Frequency Start Date End Date Duration S

tatus

 

        Flecainide Acetate 100 mg         TAKE ONE TABLET BY MOUTH EVERY 12 HOUR

S                         7       

Active

 

        Aspir-81 81 MG Orally Once a day 1 tablet 24h                           

  Active

 

        Toprol XL 50 mg         TAKE ONE TABLET BY MOUTH ONCE DAILY             

            7       Active

 

        Flecainide Acetate 100 mg Orally every 12 hrs 1 tablet 12h              

               Active

 

          Ondansetron HCl 8 MG Orally every 8 hours, PRN 1 tablet            19 

Dec, 2017           03 days

                                        Active

 

        Toprol XL 50 mg Orally Once a day 1 tablet 24h                     30 da

ys Active







RESULTS

No Results



PROCEDURES





                Procedure       Date Ordered    Result          Body Site

 

                EKG, TRACING (IN-HOUSE) 2017      N/A              

 

                COMPREHEN METABOLIC PANEL Dec 19, 2017                     

 

                ELECTROCARDIOGRAM, TRACING Dec 19, 2017                     

 

                ASSAY OF MAGNESIUM Dec 19, 2017                     







INSTRUCTIONS





MEDICATIONS ADMINISTERED

No Known Medications



MEDICAL (GENERAL) HISTORY





                    Type                Description         Date

 

                    Medical History     Atrial Fibrillation  

 

                    Surgical History    Right hand fx        

 

                    Hospitalization History Atrial fibrillation

## 2020-07-26 NOTE — XMS REPORT
[facility]

                             Created on: 2016



Sarthak Wilhelm

External Reference #: [id number]

: 1990

Sex: Male



Demographics





                          Address                   [address] [place]  [address]

 

                          Home Phone                [phone]

 

                          Preferred Language        Unknown

 

                          Marital Status            Unknown

 

                          Mandaen Affiliation     Unknown

 

                          Race                      Black

 

                          Ethnic Group              Not  or 





Author





                          Author                    Sarthak FENTON

 

                          [facility]              [employer]

 

                          Address                   Unknown

 

                          Phone                     Unavailable







Care Team Providers





                    Care Team Member Name Role                Phone

 

                    ESTEVAN FENTON   CP                  Unavailable



                                                                



Allergies, Adverse Reactions, Alerts

          



                    Substance           Reaction            Event Type

 

                    N.K.D.A.            Info Not Available  Non Drug Allergy



                                                                                
       



Problems

          



             Problem Type Condition    Code         Onset Dates  Condition Statu

s

 

             Problem      Major depressive disorder, single episode, unspecified

 F32.9                     Active

 

             Problem      Paroxysmal atrial fibrillation with rapid ventricular 

response I48.0                     

Active

 

             Problem      Atrial fibrillation, unspecified type I48.91          

          Active

 

             Problem      Adjustment disorder with depressed mood 309.0         

            Active

 

             Assessment   Atrial fibrillation, unspecified type I48.91          

          Active



                                                                                
                                               



Medications

          



        Medication Code System Code    Instructions Start Date End Date Status  

Dosage

 

        Aspir-81 NDC     [address]-35 81 MG Orally Once a day                  

       1 tablet

 

        Flecainide Acetate NDC     [address]-50 100 MG Orally every 12 hrs     

                    1 tablet



                                                                                
                 



Procedures

          



                Procedure       Coding System   Code            Date

 

                Office Visit, Est Pt., Level 3 CPT-4           38385           N

ov 2016



                                                                                
                 



Vital Signs

          



                          Date/Time:                2016

 

                          Cardiac Monitoring Heart Rate 84 bpm

 

                          Weight                    163.6 lbs

 

                          Height                    70 in

 

                          BMI                       23.47 Index

 

                          Blood Pressure Diastolic  92 mmHg

 

                          Blood Pressure Systolic   140 mmHg



                                                                              



Results

          No Known Results                                                      
             



Summary Purpose

          e[employer] Submission

## 2020-07-26 NOTE — XMS REPORT
[facility]

                             Created on: 10/26/2017



Sarthak Wilhelm

External Reference #: [id number]

: 1990

Sex: Male



Demographics





                          Address                   [address] [place]  [address]

 

                          Preferred Language        Unknown

 

                          Marital Status            Unknown

 

                          Orthodox Affiliation     Unknown

 

                          Race                      Unknown

 

                          Ethnic Group              Unknown





Author





                          Author                    Sarthak DUONG

 

                          Organization              [facility]

 

                          Address                   [address] [place]  [address]



 

                          Phone                     [phone]







Care Team Providers





                    Care Team Member Name Role                Phone

 

                    SHERRY DUONG       Unavailable         [phone]







PROBLEMS





          Type      Condition ICD9-CM Code DRP91-WM Code Onset Dates Condition S

tatus SNOMED 

Code

 

          Problem   Atrial fibrillation with RVR           I48.91              A

ctive    [id number]

 

          Problem   Atrial fibrillation, unspecified type           I48.91      

        Active    89188598

 

          Problem   Adjustment disorder with depressed mood 309.0               

          Active    46891525

 

          Problem   Major depressive disorder, single episode, unspecified      

     F32.9               Active    

[id number]

 

             Problem      Paroxysmal atrial fibrillation with rapid ventricular 

response              I48.0         

                          Active                    [id number]







ALLERGIES

No Known Allergies



SOCIAL HISTORY

Never Assessed



PLAN OF CARE





                          Activity                  Details

 

                                         

 

                          Follow Up                 3 Months Reason:Atrial Fib







VITAL SIGNS





                    Height              70 in               2017

 

                    Weight              175.0 lbs           2017

 

                    Temperature         97.2 degrees Fahrenheit 2017

 

                    Heart Rate          74 bpm              2017

 

                    Respiratory Rate    18                  2017

 

                    BMI                 25.11 kg/m2         2017

 

                    Blood pressure systolic 134 mmHg            2017

 

                    Blood pressure diastolic 72 mmHg             2017







MEDICATIONS





        Medication Instructions Dosage  Frequency Start Date End Date Duration S

tatus

 

        Aspir-81 81 MG Orally Once a day 1 tablet 24h                           

  Active

 

        Flecainide Acetate 100 MG Orally every 12 hrs 1 tablet 12h              

               Active

 

        Toprol XL 50 mg Orally Once a day 1 tablet 24h                     30 da

ys Active







RESULTS

No Results



PROCEDURES

No Known procedures



IMMUNIZATIONS

No Known Immunizations



MEDICAL (GENERAL) HISTORY





                    Type                Description         Date

 

                    Medical History     Atrial Fibrillation  

 

                    Surgical History    Right hand fx        

 

                    Hospitalization History Atrial fibrillation

## 2020-07-26 NOTE — XMS REPORT
[facility]

                             Created on: 2018



Sarthak Wilhelm

External Reference #: [id number]

: 1990

Sex: Male



Demographics





                          Address                   [address] [place]  [address]

 

                          Preferred Language        Unknown

 

                          Marital Status            Unknown

 

                          B[employer]ism Affiliation     Unknown

 

                          Race                      Unknown

 

                          Ethnic Group              Unknown





Author





                          Author                    Sarthak SHOEMAKER

 

                          [facility]ALK [facility]

 

                          Address                   [address] [place]  [address]



 

                          Phone                     [phone]







Care Team Providers





                    Care Team Member Name Role                Phone

 

                    PADMINI SHOEMAKER      Unavailable         [phone]







PROBLEMS





          Type      Condition ICD9-CM Code EYR96-VL Code Onset Dates Condition S

tatus SNOMED 

Code

 

          Problem   Psychophysiological insomnia           F51.04              A

ctive    [id number]

 

          Problem   Atrial fibrillation with RVR           I48.91              A

ctive    [id number]

 

          Problem   Major depressive disorder, single episode, unspecified      

     F32.9               Active    

[id number]

 

             Problem      Paroxysmal atrial fibrillation with rapid ventricular 

response              I48.0         

                          Active                    [id number]







ALLERGIES

No Known Allergies



ENCOUNTERS





                Encounter       Location        Date            Diagnosis

 

                          [place] [address]

[address] [place] [phone]

                                         

 

                          [place] [address] [place] [id number]

[address] [place] [address]

                                        Establishing care with new d

octor, encounter for Z76.89 ; Atrial 

fibrillation with RVR I48.91 ; Psychophysiological insomnia F51.04 and Viral 
syndrome B34.9

 

                          [place] [address] [facility] [id number]

[address] [place] [address]

                          19 Dec, 2017              Paroxysmal atrial fibrillati

on with rapid ventricular response 

I48.0 and Viral syndrome B34.9

 

                          [place] [address] [facility] [id number]

[address] [place] [address]

                                         

 

                          [facility]  [address] [place] [id number]

[address] [place] [address] 

[address]                18 May, 2017              Muscle strain of chest wall,

 initial encounter S29.011A 

and Chest pain, unspecified type R07.9

 

                          [place] [address] [place] [id number]

[address] [place] [address]

                          02 May, 2017              Paroxysmal atrial fibrillati

on with rapid ventricular response 

I48.0

 

                          [place] [address] [place] [phone]

                                        Paroxysmal atrial fibrillati

on with rapid ventricular response 

I48.0

 

                          [facility] WALK IN [place] [address] [place] [id number] [place] [address] 

[address]                              Atrial fibrillation with RVR

 I48.91

 

                          [place] [address] [address]

                          27 Dec, 2016              Paroxysmal atrial fibrillati

on with rapid ventricular response 

I48.0 and Long-term use of high-risk medication Z79.899

 

                          [place] [address] [address]

                          12 Dec, 2016               

 

                          [facility] WALK IN [place] [address] 

[address]                              Atrial fibrillation, unspeci

fied type I48.91

 

                          [facility] WALK IN [place] [address] 

[address]                              Atrial fibrillation, unspeci

fied type I48.91

 

                          [facility] WALK IN [place] [address] 

[address]                17 Oct, 2016              Major depressive disorder, s

ger episode, unspecified 

F32.9 ; Other fatigue R53.83 and Chest pain on breathing R07.1

 

                          [place] [address] [address]

                          23 Sep, 2016              PAF (paroxysmal atrial fibri

llation) I48.0 ; Dyspnea, unspecified 

type R06.00 ; Chest pain, unspecified type R07.9 and Light headedness R42

 

                          [address] [address]

                                        Paroxysmal atrial fibrillati

on with rapid ventricular response 

I48.0 and Long-term use of high-risk medication Z79.899

 

                          [place] [address] [address]

                                        Adjustment disorder with dep

ressed mood 309.0 and No condition on 

Axis II V71.09

 

                          [facility]     [address] [facility] [id number]

[address] [place] [address]

                                        Atrial fibrillation 427.31

 

                          [facility]     [address] [facility] [id number]

[address] [address]

                          15 Carl, 2015               







IMMUNIZATIONS

No Known Immunizations



SOCIAL HISTORY

Never Assessed



REASON FOR VISIT

Transition of Care--tcuppettRN, -requesting something for insomnia. Has tried me
latonin



PLAN OF CARE





                          Activity                  Details

 

                                         

 

                          Follow Up                 3 Months, prn Reason:hx afib







VITAL SIGNS





                    Height              70 in               2018

 

                    Weight              189.2 lbs           2018

 

                    Temperature         97.0 degrees Fahrenheit 2018

 

                    Heart Rate          90 bpm              2018

 

                    Respiratory Rate    18                  2018

 

                    BMI                 27.14 kg/m2         2018

 

                    Blood pressure systolic 142 mmHg            2018

 

                    Blood pressure diastolic 80 mmHg             2018







MEDICATIONS





        Medication Instructions Dosage  Frequency Start Date End Date Duration S

tatus

 

        Flecainide Acetate 100 mg         TAKE ONE TABLET BY MOUTH EVERY 12 HOUR

S                         7       

Not-Taking

 

        Toprol XL 50 mg Orally Once a day 1 tablet 24h                     30 da

ys Active

 

        Aspir-81 81 MG Orally Once a day 1 tablet 24h                           

  Active

 

             Trazodone HCl 50 mg Orally at bedtime as needed 1 tablet at bedtime

 as needed              

                            30 day(s)           Active

 

        Toprol XL 50 mg         TAKE ONE TABLET BY MOUTH ONCE DAILY             

            7       Not-Taking

 

        Flecainide Acetate 100 mg Orally every 12 hrs 1 tablet 12h              

               Active

 

        Flecainide Acetate 100 mg Orally every 12 hrs 1 tablet 12h              

       30 days Active

 

        Toprol XL 50 mg Orally Once a day 1 tablet 24h                     30 da

ys Active







RESULTS

No Results



PROCEDURES

No Known procedures



INSTRUCTIONS





MEDICATIONS ADMINISTERED

No Known Medications



MEDICAL (GENERAL) HISTORY





                    Type                Description         Date

 

                    Medical History     Atrial Fibrillation  

 

                    Surgical History    Right hand fx        

 

                    Hospitalization History Atrial fibrillation

## 2020-11-10 ENCOUNTER — HOSPITAL ENCOUNTER (OUTPATIENT)
Dept: HOSPITAL 75 - CARD | Age: 30
LOS: 90 days | Discharge: HOME | End: 2021-02-08
Attending: INTERNAL MEDICINE
Payer: MEDICAID

## 2020-11-10 DIAGNOSIS — I48.0: Primary | ICD-10-CM

## 2020-11-10 PROCEDURE — 93270 REMOTE 30 DAY ECG REV/REPORT: CPT

## 2020-11-10 PROCEDURE — 93306 TTE W/DOPPLER COMPLETE: CPT

## 2022-08-10 ENCOUNTER — HOSPITAL ENCOUNTER (EMERGENCY)
Dept: HOSPITAL 75 - ER | Age: 32
Discharge: HOME | End: 2022-08-10
Payer: MEDICAID

## 2022-08-10 VITALS — WEIGHT: 198.2 LBS | HEIGHT: 70 IN | BODY MASS INDEX: 28.37 KG/M2

## 2022-08-10 VITALS — SYSTOLIC BLOOD PRESSURE: 148 MMHG | DIASTOLIC BLOOD PRESSURE: 97 MMHG

## 2022-08-10 DIAGNOSIS — Z79.82: ICD-10-CM

## 2022-08-10 DIAGNOSIS — Z79.899: ICD-10-CM

## 2022-08-10 DIAGNOSIS — U07.1: Primary | ICD-10-CM

## 2022-08-10 DIAGNOSIS — I48.20: ICD-10-CM

## 2022-08-10 DIAGNOSIS — F17.210: ICD-10-CM

## 2022-08-10 PROCEDURE — 71045 X-RAY EXAM CHEST 1 VIEW: CPT

## 2022-08-10 PROCEDURE — 93005 ELECTROCARDIOGRAM TRACING: CPT

## 2022-08-10 NOTE — ED CARDIAC GENERAL
History of Present Illness


General


Chief Complaint:  Chest Pain


Stated Complaint:  CP


Nursing Triage Note:  


PT ARRIVED BY EMS TO [facility] 9 WITH CC OF CHEST PAIN. PT HX OF A-FIB RVR. PT REPORTS 


WAS SEEN AT [facility] AND SENT HERE TESTED POSITIVE FOR COVID AT [facility].


Source:  patient


Exam Limitations:  no limitations





History of Present Illness


Date Seen by Provider:  Aug 10, 2022


Time Seen by Provider:  13:42


Initial Comments


Patient is a 32-year-old male history of atrial fibrillation last episode about 

7 years ago.  States that he has been feeling kind of poorly for the last couple

of days.  He has felt shortness of breath especially with laying flat.  He was 

not concerned really about being in atrial fibrillation but did have some chest 

discomfort over the last couple of days so went to [facility] clinic this morning for 

evaluation.  He was COVID tested they called after the patient got to the 

emergency room and reported that his test is positive.  On arrival he has 

absolutely no chest pain or discomfort.  He is not short of breath.  No nausea. 

He states that he did have some sweaty palms last night at around 1130 when he 

felt shortness of breath lying flat.  He is not chronically anticoagulated.  He 

is only on baby aspirin, flecainide and metoprolol.  No swelling in his legs or 

cramping in his calves, no prolonged immobility.  No history of blood clot.  

Again he has not been in A. fib for about 7 years.





He follows with [facility], does not have a cardiologist.  He was placed on flecainide 

by [facility] 7 years ago.





All other review of systems reviewed and negative except as stated.


Timing/Duration:  1-2 days


Severity:  mild


Location:  central


Prior CP/Workup:  other (h/o afib)


NTG SL PTA:  No


ASA po PTA:  Yes


Associated Systoms:  Denies Symptoms





Allergies and Home Medications


Allergies


Coded Allergies:  


     No Known Drug Allergies (Unverified , 12)





Patient Home Medication List


Home Medication List Reviewed:  Yes


Aspirin (Aspir 81) 81 Mg Tablet.dr, 81 MG PO DAILY, (Reported)


   Entered as Reported by: GOSIA VAUGHN on 6/23/15 1321


Flecainide Acetate (Flecainide Acetate) 100 Mg Tablet, 100 MG PO Q12H, 

(Reported)


   Entered as Reported by: THIEN BRIAN on 20 0758


[Metoprolol Succinate] 50 MG TAB, 50 MG PO DAILY


   Prescribed by: XAVIER MCKNIGHT NWAGW on 6/10/15 0938





Review of Systems


Review of Systems


Constitutional:  see HPI


EENTM:  No Symptoms Reported


Respiratory:  No Symptoms Reported


Cardiovascular:  Chest Pain (yesterday)


Gastrointestinal:  No Symptoms Reported


Genitourinary:  No Symptoms Reported


Musculoskeletal:  no symptoms reported


Skin:  no symptoms reported


Psychiatric/Neurological:  No Symptoms Reported


Endocrine:  Other (fatigue)





All Other Systems Reviewed


Negative Unless Noted:  Yes





Past Medical-Social-Family Hx


Patient Social History


Tobacco Use?:  Yes


Tobacco type used:  Cigarettes


Smoking Status:  Current Everyday Smoker


Substance use?:  Yes


Substance frequency:  Rarely


Alcohol Use?:  Yes


Alcohol Frequency:  Couple times a week


Pt feels they are or have been:  No





Immunizations Up To Date


PED Vaccines UTD:  No


First/Initial COVID19 Vaccinat:  


Second COVID19 Vaccination Juve:  





Seasonal Allergies


Seasonal Allergies:  No





Past Medical History


Surgery/Hospitalization HX:  


A-FIB RVR


Surgeries:  Yes


Abdominal, Orthopedic


Respiratory:  No


Cardiac:  Yes


Atrial Fibrillation


Reproductive Disorders:  No


Sexually Transmitted Disease:  No


HIV/AIDS:  No


Gastrointestinal:  No


Musculoskeletal:  No


Endocrine:  No


HEENT:  No


Cancer:  No


Psychosocial:  Yes


ADD/ADHD


Integumentary:  No





Family Medical History





CVA


Diabetes mellitus


  19 MOTHER


Hypertension


  19 MOTHER





Physical Exam


Vital Signs





Vital Signs - First Documented








 8/10/22





 13:32


 


Temp 37.1


 


Pulse 70


 


Resp 17


 


B/P (MAP) 148/97 (114)


 


Pulse Ox 100





Capillary Refill : Less Than 3 Seconds


Height, Weight, BMI


Height: 5'10"


Weight: 116lbs. 12.8oz. 52.047238fi; 28.00 BMI


Method:Stated


General Appearance:  No Apparent Distress, WD/WN


HEENT:  PERRL/EOMI, Pharynx Normal


Neck:  Normal Inspection


Respiratory:  Lungs Clear, Normal Breath Sounds, No Accessory Muscle Use, No 

Respiratory Distress


Cardiovascular:  Regular Rate, Rhythm, Normal Peripheral Pulses


Gastrointestinal:  Non Tender, Soft


Extremity:  Normal Capillary Refill, Normal Inspection, Normal Range of Motion, 

Non Tender, No Calf Tenderness, No Pedal Edema


Neurologic/Psychiatric:  Alert, Oriented x3, No Motor/Sensory Deficits, Normal 

Mood/Affect, CNs II-XII Norm as Tested


Skin:  Warm/Dry





Progress/Results/Core Measures


Results/Orders


My Orders





Orders - RADHA CAMPO MD


Chest 1 View, Ap/Pa Only (8/10/22 13:49)


Covid-19 External Lab Results (8/10/22 13:49)


Isolation Central Supply Req (8/10/22 13:49)





Vital Signs/I&O











 8/10/22





 13:32


 


Temp 37.1


 


Pulse 70


 


Resp 17


 


B/P (MAP) 148/97 (114)


 


Pulse Ox 100














Blood Pressure Mean:                    114








Initial ECG Impression Date:  Aug 10, 2022


Initial ECG Impression Time:  13:45


Initial ECG Rate:  69


Initial ECG Rhythm:  Normal Sinus


Initial ECG Intervals:  Normal


Initial ECG Impression:  Normal


Comment


early repol pattern; no ST T wave elevation or depression





Diagnostic Imaging





   Diagonstic Imaging:  Xray


   Plain Films/CT/US/NM/MRI:  chest


Comments


                 [facility] [place] [place]





NAME:   NOE DEVINE [facility] REC#:   [id number]


ACCOUNT#:   [id number]


PT STATUS:   REG ER


:   1990


PHYSICIAN:   RADHA CAMPO MD


ADMIT DATE:   08/10/22/ER


                                   ***Draft***


Date of Exam:08/10/22





CHEST 1 VIEW, AP/PA ONLY








EXAMINATION: Chest, one view.





HISTORY: Chest discomfort.





COMPARISON: 2020.





FINDINGS: 





Heart size and pulmonary vasculature are normal. The lungs are


clear without consolidation, pleural effusion, or pneumothorax.


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 The osseous


structures are intact.





IMPRESSION: 





1. No acute radiographic abnormality in the chest.





  Dictated on workstation # [id number]








Dict:   08/10/22 1413


Trans:   08/10/22 1417


 [id number]





Interpreted by:     CHIQUITA TONG DO


Electronically signed by:





Departure


Impression





   Primary Impression:  


   COVID-19


   Additional Impression:  


   History of atrial fibrillation


Disposition:  01 HOME, SELF-CARE


Condition:  Stable





Departure-Patient Inst.


Decision time for Depature:  14:30


Referrals:  


CHERELLE HURT MD (PCP/Family)


Primary Care Physician


Patient Instructions:  COVID-19 (DC)





Add. Discharge Instructions:  


Drink plenty of fluids to stay well-hydrated.





Tylenol and/or ibuprofen as needed for body aches/pains/temperature over 100.4.





Continue your prescribed daily medications.





If you have any worsening symptoms of shortness of breath over the next 2 to 4 

days please come back to the emergency department for reevaluation.





Follow-up with your primary care physician as scheduled


Work/School Note:  Work Release Form   Date Seen in the Emergency Department:  

Aug 10, 2022


   Return to Work:  Aug 11, 2022





Copy


Copies To 1:   CHERELLE HURT MD, KATHRYN M MD         Aug 10, 2022 13:54

## 2022-08-10 NOTE — DIAGNOSTIC IMAGING REPORT
EXAMINATION: Chest, one view.



HISTORY: Chest discomfort.



COMPARISON: 07/25/2020.



FINDINGS: 



Heart size and pulmonary vasculature are normal. The lungs are

clear without consolidation, pleural effusion, or pneumothorax.

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 The osseous

structures are intact.



IMPRESSION: 



1. No acute radiographic abnormality in the chest.



Dictated by: 



  Dictated on workstation # [id number]

## 2022-09-09 ENCOUNTER — HOSPITAL ENCOUNTER (EMERGENCY)
Dept: HOSPITAL 75 - ER | Age: 32
Discharge: HOME | End: 2022-09-09
Payer: MEDICAID

## 2022-09-09 VITALS — DIASTOLIC BLOOD PRESSURE: 82 MMHG | SYSTOLIC BLOOD PRESSURE: 125 MMHG

## 2022-09-09 VITALS — HEIGHT: 69.69 IN | WEIGHT: 198.2 LBS | BODY MASS INDEX: 28.7 KG/M2

## 2022-09-09 DIAGNOSIS — Z79.899: ICD-10-CM

## 2022-09-09 DIAGNOSIS — R07.81: Primary | ICD-10-CM

## 2022-09-09 DIAGNOSIS — I48.0: ICD-10-CM

## 2022-09-09 LAB
ALBUMIN SERPL-MCNC: 4.6 GM/DL (ref 3.2–4.5)
ALP SERPL-CCNC: 33 U/L (ref 40–136)
ALT SERPL-CCNC: 39 U/L (ref 0–55)
BASOPHILS # BLD AUTO: 0 10^3/UL (ref 0–0.1)
BASOPHILS NFR BLD AUTO: 1 % (ref 0–10)
BILIRUB SERPL-MCNC: 0.9 MG/DL (ref 0.1–1)
BUN/CREAT SERPL: 12
CALCIUM SERPL-MCNC: 10 MG/DL (ref 8.5–10.1)
CHLORIDE SERPL-SCNC: 102 MMOL/L (ref 98–107)
CO2 SERPL-SCNC: 27 MMOL/L (ref 21–32)
CREAT SERPL-MCNC: 1.11 MG/DL (ref 0.6–1.3)
EOSINOPHIL # BLD AUTO: 0.1 10^3/UL (ref 0–0.3)
EOSINOPHIL NFR BLD AUTO: 1 % (ref 0–10)
GFR SERPLBLD BASED ON 1.73 SQ M-ARVRAT: 91 ML/MIN
GLUCOSE SERPL-MCNC: 115 MG/DL (ref 70–105)
HCT VFR BLD CALC: 41 % (ref 40–54)
HGB BLD-MCNC: 14.3 G/DL (ref 13.3–17.7)
LYMPHOCYTES # BLD AUTO: 2.5 10^3/UL (ref 1–4)
LYMPHOCYTES NFR BLD AUTO: 41 % (ref 12–44)
MANUAL DIFFERENTIAL PERFORMED BLD QL: NO
MCH RBC QN AUTO: 32 PG (ref 25–34)
MCHC RBC AUTO-ENTMCNC: 35 G/DL (ref 32–36)
MCV RBC AUTO: 92 FL (ref 80–99)
MONOCYTES # BLD AUTO: 0.3 10^3/UL (ref 0–1)
MONOCYTES NFR BLD AUTO: 5 % (ref 0–12)
NEUTROPHILS # BLD AUTO: 3.2 10^3/UL (ref 1.8–7.8)
NEUTROPHILS NFR BLD AUTO: 53 % (ref 42–75)
PLATELET # BLD: 269 10^3/UL (ref 130–400)
PMV BLD AUTO: 9.5 FL (ref 9–12.2)
POTASSIUM SERPL-SCNC: 4.1 MMOL/L (ref 3.6–5)
PROT SERPL-MCNC: 7.9 GM/DL (ref 6.4–8.2)
SODIUM SERPL-SCNC: 140 MMOL/L (ref 135–145)
WBC # BLD AUTO: 6.1 10^3/UL (ref 4.3–11)

## 2022-09-09 PROCEDURE — 36415 COLL VENOUS BLD VENIPUNCTURE: CPT

## 2022-09-09 PROCEDURE — 86141 C-REACTIVE PROTEIN HS: CPT

## 2022-09-09 PROCEDURE — 80053 COMPREHEN METABOLIC PANEL: CPT

## 2022-09-09 PROCEDURE — 85025 COMPLETE CBC W/AUTO DIFF WBC: CPT

## 2022-09-09 PROCEDURE — 84484 ASSAY OF TROPONIN QUANT: CPT

## 2022-09-09 PROCEDURE — 93005 ELECTROCARDIOGRAM TRACING: CPT

## 2022-09-09 PROCEDURE — 71045 X-RAY EXAM CHEST 1 VIEW: CPT

## 2022-09-09 NOTE — DIAGNOSTIC IMAGING REPORT
INDICATION: Chest pain.



EXAMINATION: Chest 09/09/2022



COMPARISON: 08/10/2022.



FINDINGS:



Single view of the chest. 



There is eventration of the left hemidiaphragm similar to the

previous examination. No focal infiltrates or effusions noted.

There is no pneumothorax. The heart and pulmonary vasculature are

normal.



IMPRESSION:

1. No acute cardiopulmonary process.



Dictated by: 



  Dictated on workstation # [id number]

## 2022-09-09 NOTE — ED CHEST PAIN
General


Chief Complaint:  Chest Pain


Stated Complaint:  CHEST PAIN - SOA


Source:  patient


Exam Limitations:  no limitations





History of Present Illness


Date Seen by Provider:  Sep 9, 2022


Time Seen by Provider:  12:19


Initial Comments


To ER by private car from home with reports of sharp left-sided chest pain when 

breathing for the past 2 to 3 days.  No history of this.  He does have a history

of paroxysmal atrial fibrillation managed with metoprolol and flecainide.  He is

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  He went to 

[facility] and they referred him here due to his chest pain.


Timing/Duration:  changing over time


Severity/Quality:  moderate


Location:  central


Radiation:  no radiation


Activities at Onset:  none


ASA po PTA:  No


NTG SL PTA:  No


Associated Symptoms:  No fatigue, No fever/chills, No headache, No heartburn





Allergies and Home Medications


Allergies


Coded Allergies:  


     No Known Drug Allergies (Unverified , 9/20/12)





Patient Home Medication List


Home Medication List Reviewed:  Yes


Aspirin (Aspir 81) 81 Mg Tablet.dr, 81 MG PO DAILY, (Reported)


   Entered as Reported by: GOSIA VAUGHN on 6/23/15 1321


Flecainide Acetate (Flecainide Acetate) 100 Mg Tablet, 100 MG PO Q12H, 

(Reported)


   Entered as Reported by: THIEN BRIAN on 7/26/20 0758


[Metoprolol Succinate] 50 MG TAB, 50 MG PO DAILY


   Prescribed by: XAVIER YATES on 6/10/15 0938





Review of Systems


Review of Systems


Constitutional:  see HPI; No chills, No fever


EENTM:  No Symptoms Reported


Respiratory:  No Symptoms Reported; Denies Cough, Denies Orthopnea


Cardiovascular:  See HPI, Chest Pain; Denies Edema, Denies Irregular Heart Rate,

Denies Lightheadedness


Gastrointestinal:  No Symptoms Reported


Genitourinary:  No Symptoms Reported


Musculoskeletal:  no symptoms reported


Skin:  no symptoms reported


Psychiatric/Neurological:  No Symptoms Reported





Past Medical-Social-Family Hx


Immunizations Up To Date


PED Vaccines UTD:  No


First/Initial COVID19 Vaccinat:  2021


Second COVID19 Vaccination Juve:  2021





Seasonal Allergies


Seasonal Allergies:  No





Past Medical History


Surgery/Hospitalization HX:  


A-FIB RVR


Surgeries:  Yes


Abdominal, Orthopedic


Respiratory:  No


Cardiac:  Yes


Atrial Fibrillation


Reproductive Disorders:  No


Sexually Transmitted Disease:  No


HIV/AIDS:  No


Gastrointestinal:  No


Musculoskeletal:  No


Endocrine:  No


HEENT:  No


Cancer:  No


Psychosocial:  Yes


ADD/ADHD


Integumentary:  No





Family Medical History





CVA


Diabetes mellitus


  19 MOTHER


Hypertension


  19 MOTHER





Physical Exam


Vital Signs


Capillary Refill :


Height, Weight, BMI


Height: 5'10"


Weight: 116lbs. 12.8oz. 52.961821rg; 28.00 BMI


Method:Stated


General Appearance:  No Apparent Distress, WD/WN


HEENT:  PERRL/EOMI, TMs Normal


Respiratory:  No Accessory Muscle Use, No Respiratory Distress


Cardiovascular:  Regular Rate, Rhythm, Normal Peripheral Pulses, Other (EKG 

shows sinus rhythm at 66)


Gastrointestinal:  Non Tender, Soft


Extremity:  Normal Capillary Refill, Normal Inspection


Neurologic/Psychiatric:  Alert, Oriented x3


Skin:  Normal Color, Warm/Dry





Progress/Results/Core Measures


Results/Orders


Lab Results





Laboratory Tests








Test


 9/9/22


11:50 Range/Units


 


 


White Blood Count


 6.1 


 4.3-11.0


10^3/uL


 


Red Blood Count


 4.50 


 4.30-5.52


10^6/uL


 


Hemoglobin 14.3  13.3-17.7  g/dL


 


Hematocrit 41  40-54  %


 


Mean Corpuscular Volume 92  80-99  fL


 


Mean Corpuscular Hemoglobin 32  25-34  pg


 


Mean Corpuscular Hemoglobin


Concent 35 


 32-36  g/dL





 


Red Cell Distribution Width 11.9  10.0-14.5  %


 


Platelet Count


 269 


 130-400


10^3/uL


 


Mean Platelet Volume 9.5  9.0-12.2  fL


 


Immature Granulocyte % (Auto) 0   %


 


Neutrophils (%) (Auto) 53  42-75  %


 


Lymphocytes (%) (Auto) 41  12-44  %


 


Monocytes (%) (Auto) 5  0-12  %


 


Eosinophils (%) (Auto) 1  0-10  %


 


Basophils (%) (Auto) 1  0-10  %


 


Neutrophils # (Auto)


 3.2 


 1.8-7.8


10^3/uL


 


Lymphocytes # (Auto)


 2.5 


 1.0-4.0


10^3/uL


 


Monocytes # (Auto)


 0.3 


 0.0-1.0


10^3/uL


 


Eosinophils # (Auto)


 0.1 


 0.0-0.3


10^3/uL


 


Basophils # (Auto)


 0.0 


 0.0-0.1


10^3/uL


 


Immature Granulocyte # (Auto)


 0.0 


 0.0-0.1


10^3/uL


 


Sodium Level 140  135-145  MMOL/L


 


Potassium Level 4.1  3.6-5.0  MMOL/L


 


Chloride Level 102    MMOL/L


 


Carbon Dioxide Level 27  21-32  MMOL/L


 


Anion Gap 11  5-14  MMOL/L


 


Blood Urea Nitrogen 13  7-18  MG/DL


 


Creatinine


 1.11 


 0.60-1.30


MG/DL


 


Estimat Glomerular Filtration


Rate 91 


  





 


BUN/Creatinine Ratio 12   


 


Glucose Level 115 H   MG/DL


 


Calcium Level 10.0  8.5-10.1  MG/DL


 


Corrected Calcium   8.5-10.1  MG/DL


 


Total Bilirubin 0.9  0.1-1.0  MG/DL


 


Aspartate Amino Transf


(AST/SGOT) 24 


 5-34  U/L





 


Alanine Aminotransferase


(ALT/SGPT) 39 


 0-55  U/L





 


Alkaline Phosphatase 33 L   U/L


 


Troponin I < 0.028  <0.028  NG/ML


 


C-Reactive Protein High


Sensitivity 0.21 


 0.00-0.50


MG/DL


 


Total Protein 7.9  6.4-8.2  GM/DL


 


Albumin 4.6 H 3.2-4.5  GM/DL








My Orders





Orders - YVONNE FELIZ


Ketorolac Injection (Toradol Injection) (9/9/22 12:15)


Cbc With Automated Diff (9/9/22 12:12)


Comprehensive Metabolic Panel (9/9/22 12:12)


Hs C Reactive Protein (9/9/22 12:12)


Troponin I Dmitry (9/9/22 12:12)


Chest 1 View, Ap/Pa Only (9/9/22 12:12)


Ekg Tracing (9/9/22 12:12)





Medications Given in ED





Current Medications








 Medications  Dose


 Ordered  Sig/Kimberly


 Route  Start Time


 Stop Time Status Last Admin


Dose Admin


 


 Ketorolac


 Tromethamine  15 mg  ONCE  ONCE


 IVP  9/9/22 12:15


 9/9/22 12:16 DC 9/9/22 12:23


15 MG











Departure


Impression





   Primary Impression:  


   Pleuritic chest pain


Disposition:  01 HOME, SELF-CARE


Condition:  Stable





Departure-Patient Inst.


Decision time for Depature:  12:55


Referrals:  


CHERELLE HURT MD (PCP/Family)


Primary Care Physician


Patient Instructions:  Pleuritic Chest Pain (DC)





Add. Discharge Instructions:  


Ibuprofen as needed for pain.  Return to ER for any concerns





All discharge instructions reviewed with patient and/or family. Voiced understa

[email or web address].











YVONNE FELIZ APRN              Sep 9, 2022 12:22

## 2022-10-31 ENCOUNTER — HOSPITAL ENCOUNTER (OUTPATIENT)
Dept: HOSPITAL 75 - CARD | Age: 32
End: 2022-10-31
Attending: INTERNAL MEDICINE
Payer: MEDICAID

## 2022-10-31 VITALS — SYSTOLIC BLOOD PRESSURE: 146 MMHG | DIASTOLIC BLOOD PRESSURE: 98 MMHG

## 2022-10-31 DIAGNOSIS — I10: ICD-10-CM

## 2022-10-31 DIAGNOSIS — I25.10: ICD-10-CM

## 2022-10-31 DIAGNOSIS — I34.89: Primary | ICD-10-CM

## 2022-10-31 PROCEDURE — 93306 TTE W/DOPPLER COMPLETE: CPT

## 2022-10-31 PROCEDURE — 93017 CV STRESS TEST TRACING ONLY: CPT

## 2022-10-31 NOTE — CARDIOLOGY STRESS TEST REPORT
Stress Test Report


Date of Procedure/Referring:


Date of Procedure:  Oct 31, 2022


PCP


Bijan Hurt MD


Admitting Physician


Admitting Physician:


 








Attending Physician:


Andrew Joel MD





Indications:


CP





Baseline Heart Rate:


78





Baseline Blood Pressure:


Blood Pressure Systolic:  146


Blood Pressure Diastolic:  98





Baseline EKG:


Baseline EKG:  NSR





Summary/Conclusion:


Summary:


In summary, the patient started exercising with a baseline heart rate, blood


pressure and EKG mentioned above





Patient was able to exercise for a total of 12 minutes on Gaurav protocol,  METs 

12.3


Maximum heart rate 168      


Maximum blood pressure 179/76


   


Stress EKG, Minimal nondiagnostic changes 


Recovery EKG   , Return to baseline 





Conclusion:


1.  Good exercise tolerance for a total of 12 minutes on Gaurav protocol, 12.3 

METs, achieving 88 percent of maximum expected heart rate


2.  Minimal nondiagnostic EKG changes with exercise returned to baseline during 

recovery


3.  No arrhythmia was noted





Copy


Copies To 1:   [facility] [place]


Copies To 2:   BIJAN HURT MD, BASHAR J MD              Oct 31, 2022 10:51